# Patient Record
Sex: MALE | Race: OTHER | HISPANIC OR LATINO | URBAN - METROPOLITAN AREA
[De-identification: names, ages, dates, MRNs, and addresses within clinical notes are randomized per-mention and may not be internally consistent; named-entity substitution may affect disease eponyms.]

---

## 2017-08-09 PROBLEM — Z00.00 ENCOUNTER FOR PREVENTIVE HEALTH EXAMINATION: Status: ACTIVE | Noted: 2017-08-09

## 2017-09-12 ENCOUNTER — OUTPATIENT (OUTPATIENT)
Dept: OUTPATIENT SERVICES | Facility: HOSPITAL | Age: 62
LOS: 1 days | Discharge: HOME | End: 2017-09-12

## 2017-09-12 ENCOUNTER — APPOINTMENT (OUTPATIENT)
Dept: HEMATOLOGY ONCOLOGY | Facility: CLINIC | Age: 62
End: 2017-09-12

## 2017-09-12 ENCOUNTER — RESULT REVIEW (OUTPATIENT)
Age: 62
End: 2017-09-12

## 2017-09-12 VITALS
DIASTOLIC BLOOD PRESSURE: 89 MMHG | BODY MASS INDEX: 31.18 KG/M2 | WEIGHT: 194 LBS | SYSTOLIC BLOOD PRESSURE: 122 MMHG | RESPIRATION RATE: 14 BRPM | TEMPERATURE: 96.8 F | HEART RATE: 76 BPM | HEIGHT: 66 IN

## 2017-09-12 DIAGNOSIS — K21.9 GASTRO-ESOPHAGEAL REFLUX DISEASE W/OUT ESOPHAGITIS: ICD-10-CM

## 2017-09-12 DIAGNOSIS — M35.9 SYSTEMIC INVOLVEMENT OF CONNECTIVE TISSUE, UNSPECIFIED: ICD-10-CM

## 2017-09-12 DIAGNOSIS — K44.9 GASTRO-ESOPHAGEAL REFLUX DISEASE W/OUT ESOPHAGITIS: ICD-10-CM

## 2017-09-12 DIAGNOSIS — Z78.9 OTHER SPECIFIED HEALTH STATUS: ICD-10-CM

## 2017-09-12 DIAGNOSIS — Z87.891 PERSONAL HISTORY OF NICOTINE DEPENDENCE: ICD-10-CM

## 2017-09-12 DIAGNOSIS — R91.8 OTHER NONSPECIFIC ABNORMAL FINDING OF LUNG FIELD: ICD-10-CM

## 2017-09-12 LAB
BASOPHILS # BLD: 0.05 TH/MM3
BASOPHILS NFR BLD: 0.5 %
EOSINOPHIL # BLD: 0.05 TH/MM3
EOSINOPHIL NFR BLD: 0.5 %
ERYTHROCYTE [DISTWIDTH] IN BLOOD BY AUTOMATED COUNT: 16.8 %
GRANULOCYTES # BLD: 6.15 TH/MM3
GRANULOCYTES NFR BLD: 61.1 %
HCT VFR BLD AUTO: 45.3 %
HGB BLD-MCNC: 15 G/DL
IMM GRANULOCYTES # BLD: 0.02 TH/MM3
IMM GRANULOCYTES NFR BLD: 0.2 %
LYMPHOCYTES # BLD: 3.04 TH/MM3
LYMPHOCYTES NFR BLD: 30.2 %
MCH RBC QN AUTO: 29.4 PG
MCHC RBC AUTO-ENTMCNC: 33.1 G/DL
MCV RBC AUTO: 88.8 FL
MONOCYTES # BLD: 0.75 TH/MM3
MONOCYTES NFR BLD: 7.5 %
PLATELET # BLD: 283 TH/MM3
PMV BLD AUTO: 11 FL
RBC # BLD AUTO: 5.1 MIL/MM3
WBC # BLD: 10.06 TH/MM3

## 2017-09-12 RX ORDER — DEXLANSOPRAZOLE 60 MG/1
60 CAPSULE, DELAYED RELEASE ORAL
Refills: 0 | Status: ACTIVE | COMMUNITY

## 2017-09-13 DIAGNOSIS — C84.00 MYCOSIS FUNGOIDES, UNSPECIFIED SITE: ICD-10-CM

## 2017-09-13 LAB
ALBUMIN SERPL-MCNC: 3.3 G/DL
ALBUMIN/GLOB SERPL: 0.8
ALP SERPL-CCNC: 78 IU/L
ALT SERPL-CCNC: 27 IU/L
ANION GAP SERPL CALC-SCNC: 8 MEQ/L
AST SERPL-CCNC: 26 IU/L
B2 MICROGLOB SERPL-MCNC: 2.8 MG/L
BILIRUB SERPL-MCNC: 0.6 MG/DL
BUN SERPL-MCNC: 11 MG/DL
BUN/CREAT SERPL: 19.3 %
CALCIUM SERPL-MCNC: 9.2 MG/DL
CHLORIDE SERPL-SCNC: 103 MEQ/L
CO2 SERPL-SCNC: 27 MEQ/L
CREAT SERPL-MCNC: 0.57 MG/DL
GFR SERPL CREATININE-BSD FRML MDRD: 145
GLUCOSE SERPL-MCNC: 82 MG/DL
LACTATE DEHYDROGENASE (NORTH): 192 IU/L
POTASSIUM SERPL-SCNC: 4.4 MMOL/L
PROT SERPL-MCNC: 7.4 G/DL
SODIUM SERPL-SCNC: 138 MEQ/L

## 2018-01-16 ENCOUNTER — OUTPATIENT (OUTPATIENT)
Dept: OUTPATIENT SERVICES | Facility: HOSPITAL | Age: 63
LOS: 1 days | Discharge: HOME | End: 2018-01-16

## 2018-01-16 DIAGNOSIS — R06.00 DYSPNEA, UNSPECIFIED: ICD-10-CM

## 2018-01-16 DIAGNOSIS — K21.9 GASTRO-ESOPHAGEAL REFLUX DISEASE WITHOUT ESOPHAGITIS: ICD-10-CM

## 2018-01-16 DIAGNOSIS — E66.9 OBESITY, UNSPECIFIED: ICD-10-CM

## 2018-01-16 DIAGNOSIS — J84.10 PULMONARY FIBROSIS, UNSPECIFIED: ICD-10-CM

## 2018-07-23 PROBLEM — Z78.9 ALCOHOL USE: Status: ACTIVE | Noted: 2017-09-12

## 2019-01-21 ENCOUNTER — OUTPATIENT (OUTPATIENT)
Dept: OUTPATIENT SERVICES | Facility: HOSPITAL | Age: 64
LOS: 1 days | Discharge: HOME | End: 2019-01-21

## 2019-01-21 ENCOUNTER — APPOINTMENT (OUTPATIENT)
Dept: HEMATOLOGY ONCOLOGY | Facility: CLINIC | Age: 64
End: 2019-01-21

## 2019-01-21 VITALS
RESPIRATION RATE: 14 BRPM | BODY MASS INDEX: 30.86 KG/M2 | WEIGHT: 192 LBS | TEMPERATURE: 98.7 F | HEIGHT: 66 IN | DIASTOLIC BLOOD PRESSURE: 85 MMHG | SYSTOLIC BLOOD PRESSURE: 126 MMHG

## 2019-01-21 DIAGNOSIS — C84.00 MYCOSIS FUNGOIDES, UNSPECIFIED SITE: ICD-10-CM

## 2019-01-22 DIAGNOSIS — D89.0 POLYCLONAL HYPERGAMMAGLOBULINEMIA: ICD-10-CM

## 2019-01-22 DIAGNOSIS — C84.00 MYCOSIS FUNGOIDES, UNSPECIFIED SITE: ICD-10-CM

## 2019-01-22 DIAGNOSIS — R91.8 OTHER NONSPECIFIC ABNORMAL FINDING OF LUNG FIELD: ICD-10-CM

## 2019-01-22 LAB
ALBUMIN MFR SERPL ELPH: 43.4 %
ALBUMIN SERPL-MCNC: 3.4 G/DL
ALBUMIN/GLOB SERPL: 0.8 RATIO
ALPHA1 GLOB MFR SERPL ELPH: 4.1 %
ALPHA1 GLOB SERPL ELPH-MCNC: 0.3 G/DL
ALPHA2 GLOB MFR SERPL ELPH: 11.6 %
ALPHA2 GLOB SERPL ELPH-MCNC: 0.9 G/DL
B-GLOBULIN MFR SERPL ELPH: 13.8 %
B-GLOBULIN SERPL ELPH-MCNC: 1.1 G/DL
GAMMA GLOB FLD ELPH-MCNC: 2.1 G/DL
GAMMA GLOB MFR SERPL ELPH: 27.1 %
INTERPRETATION SERPL IEP-IMP: NORMAL
LDH SERPL-CCNC: 279 U/L
M PROTEIN SPEC IFE-MCNC: NORMAL
PROT SERPL-MCNC: 7.9 G/DL
PROT SERPL-MCNC: 7.9 G/DL
RETICS # AUTO: 1.6 %
RETICS AGGREG/RBC NFR: 78.7 K/UL

## 2019-01-22 NOTE — CONSULT LETTER
[Dear  ___] : Dear ~SHELTON, [Consult Letter:] : I had the pleasure of evaluating your patient, [unfilled]. [Please see my note below.] : Please see my note below. [Consult Closing:] : Thank you very much for allowing me to participate in the care of this patient.  If you have any questions, please do not hesitate to contact me. [Sincerely,] : Sincerely, [FreeTextEntry2] : Dr. Marty Sierra [FreeTextEntry3] : Dr. SHIRLEY Abraham

## 2019-01-22 NOTE — HISTORY OF PRESENT ILLNESS
[Disease:__________________________] : Disease: [unfilled] [de-identified] : The patient is a 62 year old white male whose most recent history goes back to May when he developed shortness of breath with exertion. He was evaluated and found to have lung lesions which he had already 2 years ago and biopsies were taken back then. This time no biopsy was obtained. Previously, no malignancy was reported. In addition, because of some skin abnormalities noted by the primary physician, he was referred to the dermatologist who noted, among others, an abnormal area on the left buttock. A biopsy of the lesion was obtained. That showed lichenoid changes but mycosis fungoides could not be ruled out. 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 The appetite is good. No recent weight loss. No problems with urine or bowel movements. Recent colonoscopy has shown a polyp (pathology pending) and EGD has shown ulceration.  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 Biopsy was done demonstrating lichenoid patches and cannot rule out mycosis. \par He was on topical treatments with dermatologists with no improvement.\par He complains of shortness of breath on exertion, was a former smoker and has emphysematous changes on his last CT scan. He is following with pulmonary.  \par 2 years ago he had an EGD which demonstrated GERD and a hiatal hernia. Colonoscopy was 2 years ago and it was normal.

## 2019-01-22 NOTE — RESULTS/DATA
[FreeTextEntry1] : Patient's records including blood work, CT scan of chest reviewed. \par There was no CT of the abdomen and pelvis.\par Blood work showed polyclonal gammopathy.

## 2019-01-22 NOTE — REASON FOR VISIT
[Other: _____] : [unfilled] [Follow-Up Visit] : a follow-up visit for [FreeTextEntry2] : Increased protein, lichenoid lymphocytic infiltrates, autoimmune disorder

## 2019-01-22 NOTE — REVIEW OF SYSTEMS
[SOB on Exertion] : shortness of breath during exertion [Skin Rash] : skin rash [Negative] : ENT [Shortness Of Breath] : no shortness of breath

## 2019-01-22 NOTE — PHYSICAL EXAM
[Fully active, able to carry on all pre-disease performance without restriction] : Status 0 - Fully active, able to carry on all pre-disease performance without restriction [Normal] : normoactive bowel sounds, soft and nontender, no hepatosplenomegaly or masses appreciated [de-identified] : Few scattered crackles [de-identified] : left hip hyperpigmented, scaly patch

## 2019-01-22 NOTE — ASSESSMENT
[FreeTextEntry1] : 1. 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 Lab work from outpatient showed normal CBC and CMP\par - Patient needs to follow up with dermatology for repeat biopsy \par \par 2. Past history of ITP S/P splenectomy\par - Platelets 312.\par \par 3. 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 \par \par 4. Multiple stable pulmonary nodules/bullae, most likely secondary to environmental exposure.\par - Followed by his pulmonary specialist.\par - To have repeat CT chest. \par \par 5. ?Autoimmune disease? According to the patient, although his DIANE was elevated but DS-DNA was negative.\par \par Patient seen and examined with Dr Abraham who agreed with the above assessment and plan.

## 2020-01-27 ENCOUNTER — APPOINTMENT (OUTPATIENT)
Dept: HEMATOLOGY ONCOLOGY | Facility: CLINIC | Age: 65
End: 2020-01-27